# Patient Record
(demographics unavailable — no encounter records)

---

## 2025-01-10 NOTE — PHYSICAL EXAM
[General Appearance - Well Developed] : interactive [General Appearance - Well-Appearing] : well appearing [General Appearance - In No Acute Distress] : in no acute distress [Appearance Of Head] : the head was normocephalic [Sclera] : the sclera and conjunctiva were normal [PERRL With Normal Accommodation] : pupils were equal in size, round, reactive to light, with normal accommodation [Extraocular Movements] : extraocular movements were intact [Outer Ear] : the ears and nose were normal in appearance [Both Tympanic Membranes Were Examined] : both tympanic membranes were normal [Nasal Cavity] : the nasal mucosa and septum were normal [Examination Of The Oral Cavity] : the teeth, gums, and palate were normal [Oropharynx] : the oropharynx was normal  [Neck Cervical Mass (___cm)] : no neck mass was observed [Respiration, Rhythm And Depth] : normal respiratory rhythm and effort [Auscultation Breath Sounds / Voice Sounds] : clear bilateral breath sounds [Heart Rate And Rhythm] : heart rate and rhythm were normal [Heart Sounds] : normal S1 and S2 [Murmurs] : no murmurs [Bowel Sounds] : normal bowel sounds [Abdomen Soft] : soft [Abdominal Distention] : nondistended [Epigastric] : in the epigastric area [Musculoskeletal Exam: Normal Movement Of All Extremities] : normal movements of all extremities [Motor Tone] : muscle strength and tone were normal [No Visual Abnormalities] : no visible abnormailities [Deep Tendon Reflexes (DTR)] : deep tendon reflexes were 2+ and symmetric [Generalized Lymph Node Enlargement] : no lymphadenopathy [Skin Color & Pigmentation] : normal skin color and pigmentation [Skin Lesions] : no skin lesions [] : no significant rash [Initial Inspection: Infant Active And Alert] : active and alert

## 2025-01-10 NOTE — DISCUSSION/SUMMARY
[FreeTextEntry1] : 15 yo patient nausea and epigastric pain R/O GE reflux-Trial of Famotidine for 2 weeks Recommend: -Avoid greasy, fried, spicy foods. -Avoid eating late at night. -Avoid skipping meals. -To maintain hydration, consume "oral rehydration solution," such as Pedialyte/Pedialyte pops, water if eating. May give daily probiotic until s/s resolve. -Avoid drinking juice or soda or milk -- These can make diarrhea worse. If tolerating solids- bland diet/BRAT -- fruits, vegetables, and whole-grain breads and cereals. Avoid eating foods with a lot of fat or sugar or foods that are fried/greasy, which can make symptoms worse.  RED FLAGS REVIEWED - indications for ED eval discussed, signs of distress/dehydration reviewed -  Parent and patient agrees with plan, demonstrates an understanding, is able to repeat back instructions and has no questions at this time.  Urine and Urine culture to lab to R/O UTI. Is scheduled for CPE on 01/15/25. Return sooner PRN.

## 2025-01-10 NOTE — HISTORY OF PRESENT ILLNESS
[Acute] : for an acute visit [Abdominal Pain] : abdominal pain [Diarrhea] : diarrhea [Vomiting] : vomiting [Mother] : mother [Headache] : Headache [FreeTextEntry2] : Generalized abdominal pain with mild nausea in the morning, loose stools and spitting up intermittently. Symptoms started 2 weeks ago with menses. [FreeTextEntry1] : nausea in the morning [FreeTextEntry6] : 2 weeks ago with inset of menses

## 2025-01-10 NOTE — REVIEW OF SYSTEMS
[Vomiting] : vomiting [Diarrhea] : diarrhea [Abdominal Pain] : abdominal pain [Headache] : headache [Urinary Frequency] : urinary frequency [Pain During Urination (Dysuria)] : dysuria [Change in Activity] : no change in activity [Fever] : no fever [Wgt Loss (___ Lbs)] : no recent weight loss [Eye Discharge] : no eye discharge [Redness] : no redness [Swollen Eyelids] : no swollen eyelids [Change in Vision] : no change in vision  [Nasal Stuffiness] : no nasal congestion [Sore Throat] : no sore throat [Earache] : no earache [Nosebleeds] : no epistaxis [Cyanosis] : no cyanosis [Edema] : no edema [Diaphoresis] : not diaphoretic [Exercise Intolerance] : no persistence of exercise intolerance [Chest Pain] : no chest pain or discomfort [Palpitations] : no palpitations [Tachypnea] : not tachypneic [Wheezing] : no wheezing [Cough] : no cough [Shortness of Breath] : no shortness of breath [Change in Appetite] : no change in appetite [Constipation] : no constipation [Fainting (Syncope)] : no fainting [Seizure] : no seizures [Dizziness] : no dizziness [Limping] : no limping [Joint Pains] : no arthralgias [Joint Swelling] : no joint swelling [Back Pain] : ~T no back pain [Muscle Aches] : no muscle aches [Rash] : no rash [Insect Bites] : no insect bites [Skin Lesions] : no skin lesions [Bruising] : no tendency for easy bruising [Swollen Glands] : no lymphadenopathy [Sleep Disturbances] : ~T no sleep disturbances [Hyperactive] : no hyperactive behavior [Emotional Problems] : no ~T emotional problems [Change In Personality] : ~T no personality change [Dec Urine Output] : no oliguria [Vaginal Discharge] : no vaginal discharge [Pubertal Concerns] : no pubertal concerns [Delayed Menarche] : no delayed menarche [Irregular Periods] : no irregular periods

## 2025-01-16 NOTE — PHYSICAL EXAM
[Alert] : alert [No Acute Distress] : no acute distress [Normocephalic] : normocephalic [EOMI Bilateral] : EOMI bilateral [Clear tympanic membranes with bony landmarks and light reflex present bilaterally] : clear tympanic membranes with bony landmarks and light reflex present bilaterally  [Pink Nasal Mucosa] : pink nasal mucosa [Nonerythematous Oropharynx] : nonerythematous oropharynx [Supple, full passive range of motion] : supple, full passive range of motion [No Palpable Masses] : no palpable masses [Clear to Auscultation Bilaterally] : clear to auscultation bilaterally [Regular Rate and Rhythm] : regular rate and rhythm [Normal S1, S2 audible] : normal S1, S2 audible [No Murmurs] : no murmurs [+2 Femoral Pulses] : +2 femoral pulses [Soft] : soft [Non Distended] : non distended [NonTender] : non tender [Normoactive Bowel Sounds] : normoactive bowel sounds [No Hepatomegaly] : no hepatomegaly [No Splenomegaly] : no splenomegaly [No Abnormal Lymph Nodes Palpated] : no abnormal lymph nodes palpated [Normal Muscle Tone] : normal muscle tone [No Gait Asymmetry] : no gait asymmetry [No pain or deformities with palpation of bone, muscles, joints] : no pain or deformities with palpation of bone, muscles, joints [Straight] : straight [+2 Patella DTR] : +2 patella DTR [Cranial Nerves Grossly Intact] : cranial nerves grossly intact [No Rash or Lesions] : no rash or lesions [Gian: _____] : Gian [unfilled]

## 2025-01-16 NOTE — PHYSICAL EXAM

## 2025-01-22 NOTE — DISCUSSION/SUMMARY
[Normal Growth] : growth [Normal Development] : development  [No Elimination Concerns] : elimination [Continue Regimen] : feeding [No Skin Concerns] : skin [Normal Sleep Pattern] : sleep [None] : no medical problems [Anticipatory Guidance Given] : Anticipatory guidance addressed as per the history of present illness section [No Vaccines] : no vaccines needed [No Medications] : ~He/She~ is not on any medications [Patient] : patient [Parent/Guardian] : Parent/Guardian [FreeTextEntry1] : Flaca is a 15 yr old female presenting for WCC She has not had a WCC since 2021.Moc states she does not like to go to Doctors. , drinks [employer], sometimes soda She is stooling daily. She has not seen dentist but is brushing teeth. She is in 10th grade and doing well, not participating in any clubs or sports She does not eat regular fruit and veg, does not drink enough water daily maybe 3 cups She states that she was diagnosed with a murmur in the past and was referred to Cardiology but has never followed up. MOC states that there is a strong family hx of heart disease with 3 of mother's siblings requiring open heart surgery and one of her brothers had sudden cardiac death at age 64. Patient denies any palpitations, chest pain or SOB with exercise.  Has c/o ankle pain dating back to 2019, when she jumped and ultimate had a Sprain with swelling, went to [facility], she will get ankle pain with heavy physical exercise since, she had Xray's in past which were reportedly normal. She also reports getting knee pain occasionally after participating in Gym adn with heavy physical activity, reports that she had swelling after doing heavy walking especially last summer while in [place] in August.  MOC report that she believes that she has an allergy to Amoxicilian as she developed a rash after taking it. Unclear history  Flaca and her mother report that she has had fevers last week.  She reports having fever only 1x per day for Fevers have stopped. She also reports having fever 12/27 for several days but not daily. She did not take her temperature but feels that he had a fever because she said she felt hot. She also had 5 days of diarrhea with stools occurring 3 times per day which tapered to 1x per day, She also had belly ache and felt nauseous. She reports having Headaches  (location back of head) which occur in afternoon and can last 1 hour, Tylenol helps.  She states she is eating and drinking  Her vaccine records how that she is missing a Tdap, but mother reports that Flaca has had it at the urgent care and has given the records to the school, otherwise she is UTD and Mother declines flu and HPV vaccine today. Information given to her regarding vaccines including [facility] vaccine resource. Mother has family that believe HPV caused adverse effects.  BMI- 90% PHQ-2 score 0 Inadvertently did not conduct private Headsss exam as there there were many obscure and new complaints  History of Fevers not very good historians and often in setting of viral symptoms. Advised to get a thermometer and record when fever is suspected and maintain a log. Will follow up   Dysmenorrhea- has painful cramps throughout the course of her period, takes Aleve, Tylenol, Midol, with relief. There are no irregularities in her cycle nor heavy flow.  Consider GYN referral.  Chronic ankle/Knee pain Referral to Ortho  Hx of Murmur (unable to appreciate on exam) and Strong family hx of heart disease Referral to Cardiology  Referral to A&I for Amox challenge, rule out allergy  Patient and mother with many complaints and confusing history a and poor historians. Complaints often date back many years with inconsistencies. Discussed importance of regular well visits to for preventive health care.   Discussed having a follow up visit next week. Will run screening labs Referrals made.

## 2025-01-22 NOTE — REVIEW OF SYSTEMS
[Dysmenorrhea] : dysmenorrhea [Negative] : Genitourinary [Fever] : no fever [Vomiting] : no vomiting [Diarrhea] : no diarrhea

## 2025-01-22 NOTE — HISTORY OF PRESENT ILLNESS
[Home] : at home, [unfilled] , at the time of the visit. [Medical Office: ([facility])___] : at the medical office located in  [Mother] : mother [Verbal consent obtained from patient] : the patient, [unfilled] [de-identified] : F/U [FreeTextEntry6] :  Had previous discussed having fevers but did not have a thermometer She has since purchased a thermometer and has not noted any fevers since last we spoke  C/O tummy and nausea comes and goes Patient endorsed had had abdominal pain with diarrhea that had Stopped on Sunday No vomiting, no diarrhea  She has now started having cramping which started yesterday, however she does endorse that it is her usual premenstrual cramps and she is due to get her period in 4 days Due for menses in 4 days She states that cramping is not bad and she doesn't need to take any pain reliever

## 2025-01-22 NOTE — HISTORY OF PRESENT ILLNESS
[Yes] : Patient goes to dentist yearly [Toothpaste] : Primary Fluoride Source: Toothpaste [Up to date] : Up to date [LMP: _____] : LMP: [unfilled] [Cycle Length: _____ days] : Cycle Length: [unfilled] days [Days of Bleeding: _____] : Days of bleeding: [unfilled] [Age of Menarche: ____] : Age of Menarche: [unfilled] [Painful Cramps] : painful cramps [Sleep Concerns] : sleep concerns [Grade: ____] : Grade: [unfilled] [Normal Performance] : normal performance [Normal Behavior/Attention] : normal behavior/attention [Normal Homework] : normal homework [No] : No cigarette smoke exposure [Uses safety belts/safety equipment] : uses safety belts/safety equipment  [Has peer relationships free of violence] : has peer relationships free of violence [Has ways to cope with stress] : has ways to cope with stress [With Teen] : teen [NO] : No [Irregular menses] : no irregular menses [Heavy Bleeding] : no heavy bleeding [Eats meals with family] : does not eat meals with family [Eats regular meals including adequate fruits and vegetables] : does not eat regular meals including adequate fruits and vegetables [de-identified] : needs dental  [FreeTextEntry8] : painful, takes tylenol or aleve midol , aleve  helps  [de-identified] : no sports, no clubs,  [de-identified] : drinking water,  3 cups  daily sometimes, soda , sometimes, starbucck s [FreeTextEntry1] : BHxC/S- GDM, version abruptio placenta, meconium, Nicu for 11 days ,  [facility] PMHx No WCC since? No Records, Last [facility] 2021, murmur, referred to Cardiology, Illness- none  Hospitalizations- none Surgeries- none  Allergies- allergy cinnamon, amox allegy Hives, did not get amox challenge   Meds- none  Vaccines UTD, per mother. Flu-declines  declines HPV  Family Hx-  mom type 2 diabetic medication controlled, Dad- Graves dx, hyper cholesterolemia, Maternal Uncles all with heart disease, sudden death at age 64, others had open heart surgery, blocked arteries , Paternal aunt uncle and G-mother liver conditions, chirosis,  alcholohism, Grandmother- no hx of alcholol, maternal aunt,uncle and Grandfather diabetics Type 2  Social Hx: Current living situation- lives with Mother , father and older siblings sisters 26, 25, 24 ( two are living at home) Migration hx,  Was seen in this office last week for concerns of epigastric pain, nausea was trailed famotidine for 2 weeks, and Zofran PRN Also had some concerns related to dysuria, urine sample was not returned  No weight loss  c/o of ankle pain when jumping swelled in 2019 twisted , sprained ankle  [facility] to [facility] Only occurs once may onece with heavy physcal activity had xrays Knees hurt in gym hurt in gym pain occational in gym knees swolling from walking during summer August,  [place] in heat

## 2025-01-22 NOTE — DISCUSSION/SUMMARY
[Normal Growth] : growth [Normal Development] : development  [No Elimination Concerns] : elimination [Continue Regimen] : feeding [No Skin Concerns] : skin [Normal Sleep Pattern] : sleep [None] : no medical problems [Anticipatory Guidance Given] : Anticipatory guidance addressed as per the history of present illness section [No Vaccines] : no vaccines needed [No Medications] : ~He/She~ is not on any medications [Patient] : patient [Parent/Guardian] : Parent/Guardian [FreeTextEntry1] : Flaca is a 15 yr old female presenting for WCC She has not had a WCC since 2021.Moc states she does not like to go to Doctors. , drinks [employer], sometimes soda She is stooling daily. She has not seen dentist but is brushing teeth. She is in 10th grade and doing well, not participating in any clubs or sports She does not eat regular fruit and veg, does not drink enough water daily maybe 3 cups She states that she was diagnosed with a murmur in the past and was referred to Cardiology but has never followed up. MOC states that there is a strong family hx of heart disease with 3 of mother's siblings requiring open heart surgery and one of her brothers had sudden cardiac death at age 64. Patient denies any palpitations, chest pain or SOB with exercise.  Has c/o ankle pain dating back to 2019, when she jumped and ultimate had a Sprain with swelling, went to [facility], she will get ankle pain with heavy physical exercise since, she had Xray's in past which were reportedly normal. She also reports getting knee pain occasionally after participating in Gym adn with heavy physical activity, reports that she had swelling after doing heavy walking especially last summer while in [place] in August.  MOC report that she believes that she has an allergy to Amoxicilian as she developed a rash after taking it. Unclear history  Flaca and her mother report that she has had fevers last week.  She reports having fever only 1x per day for Fevers have stopped. She also reports having fever 12/27 for several days but not daily. She did not take her temperature but feels that he had a fever because she said she felt hot. She also had 5 days of diarrhea with stools occurring 3 times per day which tapered to 1x per day, She also had belly ache and felt nauseous. She reports having Headaches  (location back of head) which occur in afternoon and can last 1 hour, Tylenol helps.  She states she is eating and drinking  Her vaccine records how that she is missing a Tdap, but mother reports that Flaca has had it at the urgent care and has given the records to the school, otherwise she is UTD and Mother declines flu and HPV vaccine today. Information given to her regarding vaccines including [facility] vaccine resource. Mother has family that believe HPV caused adverse effects.  BMI- 90% PHQ-2 score 0 Inadvertently did not conduct private Headsss exam as there there were many obscure and new complaints  History of Fevers not very good historians and often in setting of viral symptoms. Advised to get a thermometer and record when fever is suspected and maintain a log. Will follow up   Dysmenorrhea- has painful cramps throughout the course of her period, takes Aleve, Tylenol, Midol, with relief. There are no irregularities in her cycle nor heavy flow.  Consider GYN referral.  Chronic ankle/Knee pain Referral to Ortho  Hx of Murmur (unable to appreciate on exam) and Strong family hx of heart disease Referral to Cardiology  Referral to A&I for Amox challenge, rule out allergy  Patient and mother with many complaints and confusing history a and poor historians. Complaints often date back many years with inconsistencies. Discussed importance of regular well visits to for preventive health care.   Discussed having a follow up visit next week. Will run screening labs Referrals made.

## 2025-01-22 NOTE — DISCUSSION/SUMMARY
[Normal Growth] : growth [Normal Development] : development  [No Elimination Concerns] : elimination [Continue Regimen] : feeding [No Skin Concerns] : skin [Normal Sleep Pattern] : sleep [None] : no medical problems [Anticipatory Guidance Given] : Anticipatory guidance addressed as per the history of present illness section [No Vaccines] : no vaccines needed [No Medications] : ~He/She~ is not on any medications [Patient] : patient [Parent/Guardian] : Parent/Guardian [FreeTextEntry1] : Flaca is a 15 yr old female presenting for WCC She has not had a WCC since 2021.Moc states she does not like to go to Doctors. , drinks [employer], sometimes soda She is stooling daily. She has not seen dentist but is brushing teeth. She is in 10th grade and doing well, not participating in any clubs or sports She does not eat regular fruit and veg, does not drink enough water daily maybe 3 cups She states that she was diagnosed with a murmur in the past and was referred to Cardiology but has never followed up. MOC states that there is a strong family hx of heart disease with 3 of mother's siblings requiring open heart surgery and one of her brothers had sudden cardiac death at age 64. Patient denies any palpitations, chest pain or SOB with exercise.  Has c/o ankle pain dating back to 2019, when she jumped and ultimate had a Sprain with swelling, went to [facility], she will get ankle pain with heavy physical exercise since, she had Xray's in past which were reportedly normal. She also reports getting knee pain occasionally after participating in Gym adn with heavy physical activity, reports that she had swelling after doing heavy walking especially last summer while in [place] in August.  MOC report that she believes that she has an allergy to Amoxicilian as she developed a rash after taking it. Unclear history  Flaca and her mother report that she has had fevers last week.  She reports having fever only 1x per day for Fevers have stopped. She also reports having fever 12/27 for several days but not daily. She did not take her temperature but feels that he had a fever because she said she felt hot. She also had 5 days of diarrhea with stools occurring 3 times per day which tapered to 1x per day, She also had belly ache and felt nauseous. She reports having Headaches  (location back of head) which occur in afternoon and can last 1 hour, Tylenol helps.  She states she is eating and drinking  Her vaccine records how that she is missing a Tdap, but mother reports that Flaca has had it at the urgent care and has given the records to the school, otherwise she is UTD and Mother declines flu and HPV vaccine today. Information given to her regarding vaccines including [facility] vaccine resource. Mother has family that believe HPV caused adverse effects.  BMI- 90% PHQ-2 score 0 Inadvertently did not conduct private Headsss exam as there there were many obscure and new complaints  History of Fevers not very good historians and often in setting of viral symptoms. Advised to get a thermometer and record when fever is suspected and maintain a log. Will follow up   Dysmenorrhea- has painful cramps throughout the course of her period, takes Aleve, Tylenol, Midol, with relief. There are no irregularities in her cycle nor heavy flow.  Consider GYN referral.  Chronic ankle/Knee pain Referral to Ortho  Hx of Murmur (unable to appreciate on exam) and Strong family hx of heart disease Referral to Cardiology  Referral to A&I for Amox challenge, rule out allergy  Patient and mother with many complaints and confusing history a and poor historians. Complaints often date back many years with inconsistencies. Discussed importance of regular well visits to for preventive health care.   Discussed having a follow up visit next week. Will run screening labs Referrals made.

## 2025-01-22 NOTE — RISK ASSESSMENT
[No Increased risk of SCA or SCD] : No Increased risk of SCA or SCD    [0] : 2) Feeling down, depressed, or hopeless: Not at all (0) [PHQ-2 Negative - No further assessment needed] : PHQ-2 Negative - No further assessment needed [[id number]] : 0 [Have you ever fainted, passed out or had an unexplained seizure suddenly and without warning, especially during exercise or in response] : Have you ever fainted, passed out or had an unexplained seizure suddenly and without warning, especially during exercise or in response to sudden loud noises such as doorbells, alarm clocks and ringing telephones? No [Have you ever had exercise-related chest pain or shortness of breath?] : Have you ever had exercise-related chest pain or shortness of breath? No [Has anyone in your immediate family (parents, grandparents, siblings) or other more distant relatives (aunts, uncles, cousins)  of heart] : Has anyone in your immediate family (parents, grandparents, siblings) or other more distant relatives (aunts, uncles, cousins)  of heart problems or had an unexpected sudden death before age 50 (This would include unexpected drownings, unexplained car accidents in which the relative was driving or sudden infant death syndrome.)? No [Are you related to anyone with hypertrophic cardiomyopathy or hypertrophic obstructive cardiomyopathy, Marfan syndrome, arrhythmogenic] : Are you related to anyone with hypertrophic cardiomyopathy or hypertrophic obstructive cardiomyopathy, Marfan syndrome, arrhythmogenic right ventricular cardiomyopathy, long QT syndrome, short QT syndrome, Brugada syndrome or catecholaminergic polymorphic ventricular tachycardia, or anyone younger than 50 years with a pacemaker or implantable defibrillator? No

## 2025-01-22 NOTE — REVIEW OF SYSTEMS
[Abdominal Pain] : abdominal pain [Negative] : Genitourinary [Fever] : fever [Knee Pain] : knee pain [Ankle Pain] : ankle pain [Ankle Swelling] : ankle swelling

## 2025-01-22 NOTE — HISTORY OF PRESENT ILLNESS
[Yes] : Patient goes to dentist yearly [Toothpaste] : Primary Fluoride Source: Toothpaste [Up to date] : Up to date [LMP: _____] : LMP: [unfilled] [Cycle Length: _____ days] : Cycle Length: [unfilled] days [Days of Bleeding: _____] : Days of bleeding: [unfilled] [Age of Menarche: ____] : Age of Menarche: [unfilled] [Painful Cramps] : painful cramps [Sleep Concerns] : sleep concerns [Grade: ____] : Grade: [unfilled] [Normal Performance] : normal performance [Normal Behavior/Attention] : normal behavior/attention [Normal Homework] : normal homework [No] : No cigarette smoke exposure [Uses safety belts/safety equipment] : uses safety belts/safety equipment  [Has peer relationships free of violence] : has peer relationships free of violence [Has ways to cope with stress] : has ways to cope with stress [With Teen] : teen [NO] : No [Irregular menses] : no irregular menses [Heavy Bleeding] : no heavy bleeding [Eats meals with family] : does not eat meals with family [Eats regular meals including adequate fruits and vegetables] : does not eat regular meals including adequate fruits and vegetables [de-identified] : needs dental  [FreeTextEntry8] : painful, takes tylenol or aleve midol , aleve  helps  [de-identified] : no sports, no clubs,  [de-identified] : drinking water,  3 cups  daily sometimes, soda , sometimes, starbucck s [FreeTextEntry1] : BHxC/S- GDM, version abruptio placenta, meconium, Nicu for 11 days ,  [facility] PMHx No WCC since? No Records, Last [facility] 2021, murmur, referred to Cardiology, Illness- none  Hospitalizations- none Surgeries- none  Allergies- allergy cinnamon, amox allegy Hives, did not get amox challenge   Meds- none  Vaccines UTD, per mother. Flu-declines  declines HPV  Family Hx-  mom type 2 diabetic medication controlled, Dad- Graves dx, hyper cholesterolemia, Maternal Uncles all with heart disease, sudden death at age 64, others had open heart surgery, blocked arteries , Paternal aunt uncle and G-mother liver conditions, chirosis,  alcholohism, Grandmother- no hx of alcholol, maternal aunt,uncle and Grandfather diabetics Type 2  Social Hx: Current living situation- lives with Mother , father and older siblings sisters 26, 25, 24 ( two are living at home) Migration hx,  Was seen in this office last week for concerns of epigastric pain, nausea was trailed famotidine for 2 weeks, and Zofran PRN Also had some concerns related to dysuria, urine sample was not returned  No weight loss  c/o of ankle pain when jumping swelled in 2019 twisted , sprained ankle  [facility] to [facility] Only occurs once may onece with heavy physcal activity had xrays Knees hurt in gym hurt in gym pain occational in gym knees swolling from walking during summer August,  [place] in heat

## 2025-01-22 NOTE — HISTORY OF PRESENT ILLNESS
[Yes] : Patient goes to dentist yearly [Toothpaste] : Primary Fluoride Source: Toothpaste [Up to date] : Up to date [LMP: _____] : LMP: [unfilled] [Cycle Length: _____ days] : Cycle Length: [unfilled] days [Days of Bleeding: _____] : Days of bleeding: [unfilled] [Age of Menarche: ____] : Age of Menarche: [unfilled] [Painful Cramps] : painful cramps [Sleep Concerns] : sleep concerns [Grade: ____] : Grade: [unfilled] [Normal Performance] : normal performance [Normal Behavior/Attention] : normal behavior/attention [Normal Homework] : normal homework [No] : No cigarette smoke exposure [Uses safety belts/safety equipment] : uses safety belts/safety equipment  [Has peer relationships free of violence] : has peer relationships free of violence [Has ways to cope with stress] : has ways to cope with stress [With Teen] : teen [NO] : No [Irregular menses] : no irregular menses [Heavy Bleeding] : no heavy bleeding [Eats meals with family] : does not eat meals with family [Eats regular meals including adequate fruits and vegetables] : does not eat regular meals including adequate fruits and vegetables [de-identified] : needs dental  [FreeTextEntry8] : painful, takes tylenol or aleve midol , aleve  helps  [de-identified] : no sports, no clubs,  [de-identified] : drinking water,  3 cups  daily sometimes, soda , sometimes, starbucck s [FreeTextEntry1] : BHxC/S- GDM, version abruptio placenta, meconium, Nicu for 11 days ,  [facility] PMHx No WCC since? No Records, Last [facility] 2021, murmur, referred to Cardiology, Illness- none  Hospitalizations- none Surgeries- none  Allergies- allergy cinnamon, amox allegy Hives, did not get amox challenge   Meds- none  Vaccines UTD, per mother. Flu-declines  declines HPV  Family Hx-  mom type 2 diabetic medication controlled, Dad- Graves dx, hyper cholesterolemia, Maternal Uncles all with heart disease, sudden death at age 64, others had open heart surgery, blocked arteries , Paternal aunt uncle and G-mother liver conditions, chirosis,  alcholohism, Grandmother- no hx of alcholol, maternal aunt,uncle and Grandfather diabetics Type 2  Social Hx: Current living situation- lives with Mother , father and older siblings sisters 26, 25, 24 ( two are living at home) Migration hx,  Was seen in this office last week for concerns of epigastric pain, nausea was trailed famotidine for 2 weeks, and Zofran PRN Also had some concerns related to dysuria, urine sample was not returned  No weight loss  c/o of ankle pain when jumping swelled in 2019 twisted , sprained ankle  [facility]ent to [facility] Only occurs once may onece with heavy physcal activity had xrays Knees hurt in gym hurt in gym pain occational in gym knees swolling from walking during summer August,  [place] in heat

## 2025-01-22 NOTE — RISK ASSESSMENT
[No Increased risk of SCA or SCD] : No Increased risk of SCA or SCD    [0] : 2) Feeling down, depressed, or hopeless: Not at all (0) [PHQ-2 Negative - No further assessment needed] : PHQ-2 Negative - No further assessment needed [[id number]] : 0 [Have you ever fainted, passed out or had an unexplained seizure suddenly and without warning, especially during exercise or in response] : Have you ever fainted, passed out or had an unexplained seizure suddenly and without warning, especially during exercise or in response to sudden loud noises such as doorbells, alarm clocks and ringing telephones? No [Have you ever had exercise-related chest pain or shortness of breath?] : Have you ever had exercise-related chest pain or shortness of breath? No [Has anyone in your immediate family (parents, grandparents, siblings) or other more distant relatives (aunts, uncles, cousins)  of heart] : Has anyone in your immediate family (parents, grandparents, siblings) or other more distant relatives (aunts, uncles, cousins)  of heart problems or had an unexpected sudden death before age 50 (This would include unexpected drownings, unexplained car accidents in which the relative was driving or sudden infant death syndrome.)? No [Are you related to anyone with hypertrophic cardiomyopathy or hypertrophic obstructive cardiomyopathy, Marfan syndrome, arrhythmogenic] : Are you related to anyone with hypertrophic cardiomyopathy or hypertrophic obstructive cardiomyopathy, Marfan syndrome, arrhythmogenic right ventricular cardiomyopathy, long QT syndrome, short QT syndrome, Brugada syndrome or catecholaminergic polymorphic ventricular tachycardia, or anyone younger than 50 years with a pacemaker or implantable defibrillator? No

## 2025-01-22 NOTE — HISTORY OF PRESENT ILLNESS
[Yes] : Patient goes to dentist yearly [Toothpaste] : Primary Fluoride Source: Toothpaste [Up to date] : Up to date [LMP: _____] : LMP: [unfilled] [Cycle Length: _____ days] : Cycle Length: [unfilled] days [Days of Bleeding: _____] : Days of bleeding: [unfilled] [Age of Menarche: ____] : Age of Menarche: [unfilled] [Painful Cramps] : painful cramps [Sleep Concerns] : sleep concerns [Grade: ____] : Grade: [unfilled] [Normal Performance] : normal performance [Normal Behavior/Attention] : normal behavior/attention [Normal Homework] : normal homework [No] : No cigarette smoke exposure [Uses safety belts/safety equipment] : uses safety belts/safety equipment  [Has peer relationships free of violence] : has peer relationships free of violence [Has ways to cope with stress] : has ways to cope with stress [With Teen] : teen [NO] : No [Irregular menses] : no irregular menses [Heavy Bleeding] : no heavy bleeding [Eats meals with family] : does not eat meals with family [Eats regular meals including adequate fruits and vegetables] : does not eat regular meals including adequate fruits and vegetables [de-identified] : needs dental  [FreeTextEntry8] : painful, takes tylenol or aleve midol , aleve  helps  [de-identified] : no sports, no clubs,  [de-identified] : drinking water,  3 cups  daily sometimes, soda , sometimes, starbucck s [FreeTextEntry1] : BHxC/S- GDM, version abruptio placenta, meconium, Nicu for 11 days ,  [facility] PMHx No WCC since? No Records, Last [facility] 2021, murmur, referred to Cardiology, Illness- none  Hospitalizations- none Surgeries- none  Allergies- allergy cinnamon, amox allegy Hives, did not get amox challenge   Meds- none  Vaccines UTD, per mother. Flu-declines  declines HPV  Family Hx-  mom type 2 diabetic medication controlled, Dad- Graves dx, hyper cholesterolemia, Maternal Uncles all with heart disease, sudden death at age 64, others had open heart surgery, blocked arteries , Paternal aunt uncle and G-mother liver conditions, chirosis,  alcholohism, Grandmother- no hx of alcholol, maternal aunt,uncle and Grandfather diabetics Type 2  Social Hx: Current living situation- lives with Mother , father and older siblings sisters 26, 25, 24 ( two are living at home) Migration hx,  Was seen in this office last week for concerns of epigastric pain, nausea was trailed famotidine for 2 weeks, and Zofran PRN Also had some concerns related to dysuria, urine sample was not returned  No weight loss  c/o of ankle pain when jumping swelled in 2019 twisted , sprained ankle  [facility]ent to [facility] Only occurs once may onece with heavy physcal activity had xrays Knees hurt in gym hurt in gym pain occational in gym knees swolling from walking during summer August,  [place] in heat

## 2025-01-22 NOTE — RISK ASSESSMENT
[No Increased risk of SCA or SCD] : No Increased risk of SCA or SCD    [0] : 2) Feeling down, depressed, or hopeless: Not at all (0) [PHQ-2 Negative - No further assessment needed] : PHQ-2 Negative - No further assessment needed [[id number]] : 0 [Have you ever fainted, passed out or had an unexplained seizure suddenly and without warning, especially during exercise or in response] : Have you ever fainted, passed out or had an unexplained seizure suddenly and without warning, especially during exercise or in response to sudden loud noises such as doorbells, alarm clocks and ringing telephones? No [Have you ever had exercise-related chest pain or shortness of breath?] : Have you ever had exercise-related chest pain or shortness of breath? No [Has anyone in your immediate family (parents, grandparents, siblings) or other more distant relatives (aunts, uncles, cousins)  of heart] : Has anyone in your immediate family (parents, grandparents, siblings) or other more distant relatives (aunts, uncles, cousins)  of heart problems or had an unexpected sudden death before age 50 (This would include unexpected drownings, unexplained car accidents in which the relative was driving or sudden infant death syndrome.)? No [Are you related to anyone with hypertrophic cardiomyopathy or hypertrophic obstructive cardiomyopathy, Marfan syndrome, arrhythmogenic] : Are you related to anyone with hypertrophic cardiomyopathy or hypertrophic obstructive cardiomyopathy, Marfan syndrome, arrhythmogenic right ventricular cardiomyopathy, long QT syndrome, short QT syndrome, Brugada syndrome or catecholaminergic polymorphic ventricular tachycardia, or anyone younger than 50 years with a pacemaker or implantable defibrillator? No

## 2025-01-22 NOTE — DISCUSSION/SUMMARY
[Normal Growth] : growth [Normal Development] : development  [No Elimination Concerns] : elimination [Continue Regimen] : feeding [No Skin Concerns] : skin [Normal Sleep Pattern] : sleep [None] : no medical problems [Anticipatory Guidance Given] : Anticipatory guidance addressed as per the history of present illness section [No Vaccines] : no vaccines needed [No Medications] : ~He/She~ is not on any medications [Patient] : patient [Parent/Guardian] : Parent/Guardian [FreeTextEntry1] : Flaca is a 15 yr old female presenting for WCC She has not had a WCC since 2021.Moc states she does not like to go to Doctors. , drinks [employer], sometimes soda She is stooling daily. She has not seen dentist but is brushing teeth. She is in 10th grade and doing well, not participating in any clubs or sports She does not eat regular fruit and veg, does not drink enough water daily maybe 3 cups She states that she was diagnosed with a murmur in the past and was referred to Cardiology but has never followed up. MOC states that there is a strong family hx of heart disease with 3 of mother's siblings requiring open heart surgery and one of her brothers had sudden cardiac death at age 64. Patient denies any palpitations, chest pain or SOB with exercise.  Has c/o ankle pain dating back to 2019, when she jumped and ultimate had a Sprain with swelling, [facility], she will get ankle pain with heavy physical exercise since, she had Xray's in past which were reportedly normal. She also reports getting knee pain occasionally after participating in Gym adn with heavy physical activity, reports that she had swelling after doing heavy walking especially last summer while in [place] in August.  MOC report that she believes that she has an allergy to Amoxicilian as she developed a rash after taking it. Unclear history  Flaca and her mother report that she has had fevers last week.  She reports having fever only 1x per day for Fevers have stopped. She also reports having fever 12/27 for several days but not daily. She did not take her temperature but feels that he had a fever because she said she felt hot. She also had 5 days of diarrhea with stools occurring 3 times per day which tapered to 1x per day, She also had belly ache and felt nauseous. She reports having Headaches  (location back of head) which occur in afternoon and can last 1 hour, Tylenol helps.  She states she is eating and drinking  Her vaccine records how that she is missing a Tdap, but mother reports that Flaca has had it at the urgent care and has given the records to the school, otherwise she is UTD and Mother declines flu and HPV vaccine today. Information given to her regarding vaccines including [facility] vaccine resource. Mother has family that believe HPV caused adverse effects.  BMI- 90% PHQ-2 score 0 Inadvertently did not conduct private Headsss exam as there there were many obscure and new complaints  History of Fevers not very good historians and often in setting of viral symptoms. Advised to get a thermometer and record when fever is suspected and maintain a log. Will follow up   Dysmenorrhea- has painful cramps throughout the course of her period, takes Aleve, Tylenol, Midol, with relief. There are no irregularities in her cycle nor heavy flow.  Consider GYN referral.  Chronic ankle/Knee pain Referral to Ortho  Hx of Murmur (unable to appreciate on exam) and Strong family hx of heart disease Referral to Cardiology  Referral to A&I for Amox challenge, rule out allergy  Patient and mother with many complaints and confusing history a and poor historians. Complaints often date back many years with inconsistencies. Discussed importance of regular well visits to for preventive health care.   Discussed having a follow up visit next week. Will run screening labs Referrals made.

## 2025-01-22 NOTE — RISK ASSESSMENT
[No Increased risk of SCA or SCD] : No Increased risk of SCA or SCD    [0] : 2) Feeling down, depressed, or hopeless: Not at all (0) [PHQ-2 Negative - No further assessment needed] : PHQ-2 Negative - No further assessment needed [[id number]] : 0 [Have you ever fainted, passed out or had an unexplained seizure suddenly and without warning, especially during exercise or in response] : Have you ever fainted, passed out or had an unexplained seizure suddenly and without warning, especially during exercise or in response to sudden loud noises such as doorbells, alarm clocks and ringing telephones? No [Have you ever had exercise-related chest pain or shortness of breath?] : Have you ever had exercise-related chest pain or shortness of breath? No [Has anyone in your immediate family (parents, grandparents, siblings) or other more distant relatives (aunts, uncles, cousins)  of heart] : Has anyone in your immediate family (parents, grandparents, siblings) or other more distant relatives (aunts, uncles, cousins)  of heart problems or had an unexpected sudden death before age 50 (This would include unexpected drownings, unexplained car accidents in which the relative was driving or sudden infant death syndrome.)? No [Are you related to anyone with hypertrophic cardiomyopathy or hypertrophic obstructive cardiomyopathy, Marfan syndrome, arrhythmogenic] : Are you related to anyone with hypertrophic cardiomyopathy or hypertrophic obstructive cardiomyopathy, Marfan syndrome, arrhythmogenic right ventricular cardiomyopathy, long QT syndrome, short QT syndrome, Brugada syndrome or catecholaminergic polymorphic ventricular tachycardia, or anyone younger than 50 years with a pacemaker or implantable defibrillator? No

## 2025-01-22 NOTE — DISCUSSION/SUMMARY
[FreeTextEntry1] : TTM visit  Had previous discussed having fevers but did not have a thermometer She has since purchased a thermometer and has not noted any fevers since last we spoke  C/O tummy and nausea comes and goes Patient endorsed had had abdominal pain with diarrhea that had Stopped on Sunday No vomiting, no diarrhea  She has now started having cramping which started yesterday, however she does endorse that it is her usual premenstrual cramps and she is due to get her period in 4 days Due for menses in 4 days She states that cramping is not bad, and she doesn't need to take any pain reliever She often experiences dysmenorrhea to the point where she will often call to be picked up from school. Referral given for Peds GYN  Reviewed all labs- all labs reassuring Discussed HDL and LDLs Discussed eating a healthy diet Discussed My plate and offered nutritional referral Discussed following up with all previous referrals given

## 2025-02-05 NOTE — PHYSICAL EXAM
[NL] : warm, clear [de-identified] :  CN II-XII intact, strength 5/5 throughout bilateral UE and LE, sensation to light touch intact throughout, DTR 2+ quads/triceps, no dysmetria, normal Romberg, normal gait, normal tone

## 2025-02-05 NOTE — HISTORY OF PRESENT ILLNESS
[FreeTextEntry6] : Here today for acute on chronic vomiting  New patient to practice this past month Had acute visit early Jan for GI issues, trial famotidine for 2 weeks, zofran PRN Had dysuria then, didn't return urine sample Had well visit with us, no prior well care since 2021 Had 1/22/25 telehealth visit for GI issues  Today here for nausea and vomiting in the morning History of daily nausea with periods Occurs when she wakes up Vomits 3x NBNB, usually yellow/clear, no coffee ground Nausea lasts for a few hours every morning  No symptoms afternoon/evening, except yesterday had nausea all day Started 1 month ago, on and off Daily vomiting for last 2 weeks (ONLY during weekdays) Symptoms prior were only during period, but period recently was 1/28-2/3 Period every month, never skips, never twice in a month, no bleeding longer than 8 days Decreased eating overall, feels nausea with all eating, next to no feed yesterday  No issues with drinking, normal UOP No fevers, cough, headaches, dysuria, rashes, new/recent joint pain in last 2 weeks Has had runny nose for 2 weeks, had sore throat for 1 day on 1/28 Had diarrhea for 3 weeks in Jan, daily For last 2 weeks, decreased stool frequency, last yesterday, never hard  No prior history of hard stools  No blood in stools  If she sleeps in, or sleeps in during weekend, no issues Today down 3 kg in 1 month  Past Saturday woke up at 12pm, no vomiting, no nausea  Labs Jan 2025 all normal   During private HEADS assessment: Smokes weed and vapes  Was smoking 1-2x/week Vaping everyday Recently stopped these past 2 weeks  No weed or vaping in last 2 weeks  MOM DOES NOT KNOW  Denies sexually activity Feels down last few weeks due to grades, going down to secondary to absences  No prior life stressors, sadness/anxiety, deaths in the family Denies attempting to miss school, likes school   Took famotidine for 2 weeks and stopped, didn't help Only took zofran once 2 days ago, helped a little  Endorses headaches, daily during period but can occur when off period Occasional night time awakenings, roughly 4-5x this past month No first wake up headaches Occipital, throbbing, no photophobia/phonophobia  Symptoms all began with periods roughly 2 years ago when she had COVID First period was age 11 years

## 2025-02-05 NOTE — DISCUSSION/SUMMARY
[FreeTextEntry1] : 15 year old here for acute on chronic, early morning nausea and vomiting with recent worsening and 3 kg weight loss. Normal exam today. Broad differential below:  - Possible primary GI issue given early Jan 2025 diarrhea and recent evening nausea after PO. Recommended omeprazole trial and urgent GI referral, given today - Possible secondary to period given history of dysmenorrhea/nausea and headaches during period. Recent symptoms were prior to and after period which is different. If symptoms improve over next 2 weeks, may suggest hormonal cause. Patient denies, but early morning vomiting does put pregnancy on the differential as well in this age.  - Possible secondary to marijuana / vaping use, though only was vaping daily. Patient agrees to be abstinent, if symptoms improve may suggest this as cause - Possible related to migraines given occasional night time awakenings. This combined with early AM vomiting are both neurologic red flags.  Step wise plan in shared decision making with family - Will obtain additional labs today - Start omeprazole for 1 month trial  - Referred to GI and Neurology - Continue daily diary of symptoms  - Focus on high calorie foods and hydration - Provided note for school to allow for late morning start / half days / extended homework deadlines and testing time, strongly encouraged to tolerate as much school as possible  - Please go to the emergency room for frequent vomiting, green vomit, severe lethargy, confusion, weakness, blood in vomit, unable to tolerate liquid and pees less than 3 times daily  - Follow up in 2 weeks for weight check and repeat exam - can consider birth control at that time pending symptom course - Call office for any worsening or new symptoms - MOC and patient expressed understanding of plan    During today's visit, services included coordinating care, counseling and education patient/family/caregiver, documenting clinical information in the electronic health record, reviewed medical records and data, total time spent on the E/M service before/during/after visit on date of service: 60 minutes

## 2025-02-14 NOTE — BIRTH HISTORY
[Duration: ___ wks] : duration: [unfilled] weeks [] :  [Normal?] : normal delivery [___ lbs.] : [unfilled] lbs [Was child in NICU?] : Child was in NICU

## 2025-02-19 NOTE — DISCUSSION/SUMMARY
[FreeTextEntry1] : Here for follow up of prolonged AM nausea / vomiting that lasted roughly 1/22-2/10. Typically only occurred in past during periods, but this episode started before and lasted after period. Weight loss at last visit resolved, either due to return of appetite vs mis measurement that day. All labs normal.   Differential still unclear. Will need to see if symptoms return for prolonged time during next period.   1. If symptoms do not recur to same extent, may suggest GERD as cause as patient is on PPI (or vaping/weed as cause - NOT disclosed to Mom).   2. If symptoms recur during period only, may suggest hormonal cause 3. If symptoms are severe/prolonged again during period, may need trial of hormone/birth control. Reviewed with Mom the safety of this medicine, does not affect future fertility, and how to trial medicine safely. Reviewed no other medication for menstrual related nausea available.  4. Please follow up with GI and Neurology (for night time awakening from headaches) as scheduled  Call office for any worsening or concerns.   During today's visit, services included coordinating care, counseling and education patient/family/caregiver, documenting clinical information in the electronic health record, reviewed medical records and data, total time spent on the E/M service before/during/after visit on date of service: 40 minutes

## 2025-02-20 NOTE — DATA REVIEWED
[de-identified] : Bilateral Ankle AP/lateral/oblique X-rays: There is no fracture or cortical irregularity noted. The growth plates are open with no widening or irregularities of the growth plate. The mortise joint appears normal with no widening over the medial or lateral aspect of the joint. There is no OCD lesion noted.  There is no callus formation indicating a hidden healing fracture. There are no suspicious cysts or masses noted. No signs of osteopenia.   Right knee AP/LAT/obl [place] x rays: No fracture. No OCD. The joint line appears normal.

## 2025-02-20 NOTE — END OF VISIT
[FreeTextEntry3] : A physician assistant/resident assisted with documenting the visit and acted as a scribe. I have seen and examined the patient, made my assessment and plan and have made all modifications necessary to the note.  Renee Galvez MD Pediatric Orthopaedics Surgery [facility]

## 2025-02-20 NOTE — END OF VISIT
[FreeTextEntry3] : A physician assistant/resident assisted with documenting the visit and acted as a scribe. I have seen and examined the patient, made my assessment and plan and have made all modifications necessary to the note.  Renee Galvez MD Pediatric Orthopaedics Surgery [facility]

## 2025-02-20 NOTE — HISTORY OF PRESENT ILLNESS
[FreeTextEntry1] : Flaca is a 15-year-old girl who has been experiencing chronic bilateral ankle swelling and mild discomfort along with right anterior knee pain.  She states that she twisted both her ankles status post fall down the stairs in 2019 and 2020.  She was treated for both of them at the urgent care and at [facility] emergency room x-rays ruled out any fractures.  She never did physical therapy.  She was also doing warm ups in gym September 2024 when she twisted her right knee resulting in discomfort.  She states she has lingering discomfort and on and off swelling with prolonged activities.  She presents today for pediatric orthopedic consultation.
[FreeTextEntry1] : Flaca is a 15-year-old girl who has been experiencing chronic bilateral ankle swelling and mild discomfort along with right anterior knee pain.  She states that she twisted both her ankles status post fall down the stairs in 2019 and 2020.  She was treated for both of them at the urgent care and at [facility] emergency room x-rays ruled out any fractures.  She never did physical therapy.  She was also doing warm ups in gym September 2024 when she twisted her right knee resulting in discomfort.  She states she has lingering discomfort and on and off swelling with prolonged activities.  She presents today for pediatric orthopedic consultation.
Poor

## 2025-02-20 NOTE — REASON FOR VISIT
[Initial Evaluation] : an initial evaluation [Patient] : patient [Mother] : mother [FreeTextEntry1] : Chronic ankle and right anterior knee pain

## 2025-02-20 NOTE — DATA REVIEWED
[de-identified] : Bilateral Ankle AP/lateral/oblique X-rays: There is no fracture or cortical irregularity noted. The growth plates are open with no widening or irregularities of the growth plate. The mortise joint appears normal with no widening over the medial or lateral aspect of the joint. There is no OCD lesion noted.  There is no callus formation indicating a hidden healing fracture. There are no suspicious cysts or masses noted. No signs of osteopenia.   Right knee AP/LAT/obl [place] x rays: No fracture. No OCD. The joint line appears normal.

## 2025-02-20 NOTE — PHYSICAL EXAM
[FreeTextEntry1] : Pleasant and cooperative with exam, appropriate for age. Ambulates without evidence of antalgia and limp, good coordination and balance. AAOX3  Skin: No rashes noted.  Eyes: Both conjunctiva, eyelids and pupils are present.  ENT:  Both ears, nose and lips are present. No nasal congestion.  Resp: No cough or wheezing noted.  Bilateral Ankle: Full active and passive range of motion of the ankle. There is no edema, ecchymosis or erythema noted over the ankle. 2+ pulses palpated. Capillary refill +1 in all toes. No lymphedema. Skin is warm and intact. Neurologically intact with intact Achilles DTR. Muscle strength 5/5. There is no pain elicited with palpation over the lateral, medial and posterior malleolus. There is no discomfort noted over the anterior aspect of the ankle. Negative anterior drawer sign. No pain elicited with palpation over the anterior, posterior tibiofibular ligament along with the deltoid ligament. Good flexibility of the Achilles tendon with the knee in flexion and extension. The joint is stable with stress maneuvers.   Right Knee: Full active and passive range of motion of the knee with good muscle strength 5 5. Neurologically intact. DTRs intact. There is no palpable or audible clicking in the knee with range of motion. There is no quadriceps atrophy noted. There is no edema, effusion, erythema or ecchymosis noted. There are no signs of Genu Varum or Valgum. There is no pain over the tibial tubercle, + mild pain elicited with palpation via patellar tendon. There is no discomfort with palpation over the medial/lateral joint space. There is no discomfort elicited with palpation over the medial/lateral aspect of the patella. There is no discomfort with palpation over the MCL/LCL ligaments. Negative patella apprehension sign. Negative patella grind test. Negative Evan's test. There is a good endpoint on Lachman's exam. Negative anterior/posterior drawer sign. The knee joint is stable with varus/valgus stress.  There is no active hip pain. 2+ pulses palpated, with capillary refill pulse one in all toes.

## 2025-02-20 NOTE — ASSESSMENT
[FreeTextEntry1] : Flaca is a 15-year-old girl who has chronic bilateral ankle and knee pain with swelling and occasional bouts of right knee pain status post injuries in the past. Today's assessment was performed with the assistance of the patient's parent as an independent historian as the patient's history is unreliable.  The recommendation at this time would be to complete a course of P.T. and do activities as tolerated. She will follow up in 6-8 weeks for a repeat examination and if there is no improvement an MRI may be indicated.   We had a thorough talk in regard to the diagnosis, prognosis and treatment modalities.  All questions and concerns were addressed today. There was a verbal understanding from the parents and patient.   ROSE MARIE Sharp have acted as a scribe and documented the above information for Dr. Galvez.   This note was generated using Dragon medical dictation software. A reasonable effort has been made for proofreading its contents; however, typos may still remain. If there are any questions or points of clarification needed, please do not hesitate to contact my office.   The above documentation completed by the scribe is an accurate record of both my words and actions.   Dr. Galvez

## 2025-03-04 NOTE — ASSESSMENT
[FreeTextEntry1] : 15 yo teen girl with history of headaches p/w worsening headaches with photophobia/nausea and vomiting in the mornings and awakening her at night x 1 month.

## 2025-03-04 NOTE — CONSULT LETTER
[Dear  ___] : Dear  [unfilled], [Consult Letter:] : I had the pleasure of evaluating your patient, [unfilled]. [Please see my note below.] : Please see my note below. [Consult Closing:] : Thank you very much for allowing me to participate in the care of this patient.  If you have any questions, please do not hesitate to contact me. [Sincerely,] : Sincerely, [FreeTextEntry3] : Michelle Guaman MD Child Neurologist [address], [address] [place] [address] Phone: [phone]

## 2025-03-04 NOTE — PHYSICAL EXAM
[Well-appearing] : well-appearing [Normocephalic] : normocephalic [No dysmorphic facial features] : no dysmorphic facial features [No ocular abnormalities] : no ocular abnormalities [Neck supple] : neck supple [No deformities] : no deformities [Alert] : alert [Well related, good eye contact] : well related, good eye contact [Conversant] : conversant [Normal speech and language] : normal speech and language [Follows instructions well] : follows instructions well [VFF] : VFF [Pupils reactive to light and accommodation] : pupils reactive to light and accommodation [Full extraocular movements] : full extraocular movements [Saccadic and smooth pursuits intact] : saccadic and smooth pursuits intact [No nystagmus] : no nystagmus [No papilledema] : no papilledema [Normal facial sensation to light touch] : normal facial sensation to light touch [No facial asymmetry or weakness] : no facial asymmetry or weakness [Gross hearing intact] : gross hearing intact [Equal palate elevation] : equal palate elevation [Good shoulder shrug] : good shoulder shrug [Normal tongue movement] : normal tongue movement [Midline tongue, no fasciculations] : midline tongue, no fasciculations [Normal axial and appendicular muscle tone] : normal axial and appendicular muscle tone [Gets up on table without difficulty] : gets up on table without difficulty [No pronator drift] : no pronator drift [Normal finger tapping and fine finger movements] : normal finger tapping and fine finger movements [No abnormal involuntary movements] : no abnormal involuntary movements [5/5 strength in proximal and distal muscles of arms and legs] : 5/5 strength in proximal and distal muscles of arms and legs [Able to walk on heels] : able to walk on heels [Able to walk on toes] : able to walk on toes [2+ biceps] : 2+ biceps [Triceps] : triceps [Knee jerks] : knee jerks [No ankle clonus] : no ankle clonus [No dysmetria on FTNT] : no dysmetria on FTNT [Good walking balance] : good walking balance [Normal gait] : normal gait [Able to tandem well] : able to tandem well [Negative Romberg] : negative Romberg

## 2025-03-04 NOTE — HISTORY OF PRESENT ILLNESS
[Throbbing] : throbbing [___ Times Per Week] : [unfilled] times each week [0] : a current pain level of 0/10 [7] : an average pain level of 7/10 [Nausea] : nausea [Photophobia] : photophobia [Dizziness] : dizziness [Vomiting] : Vomiting [Aura] : Aura: Yes [FreeTextEntry1] : UZIEL DISLA is a 15 year old girl who presents for initial evaluation for headaches.  She was referred by her pediatrician.  Over the last month has had worsening headaches.  Occur in morning on awakening or wake her up at night.  Associated with nausea and emesis.  There was a period she was vomiting daily in the morning x 2 weeks.  Current frequency is 2-3 times/week.  She attributes these episodes to waking up early in the morning for school (do not occur on weekends).  HA are relieved with ibuprofen or aleve.  Lying down does not help.  She has had intermittent headaches prior to this for years.  She generally wakes up nauseous and has not been able to eat breakfast in the morning due to nausea since she was a young child.  Her doctor prescribed her omeprazole previously, which she is not taking currently.  5-6 hours/night, difficulty falling asleep at night; takes melatonin (unknown amount) as needed No breakfast 12 pm or 3 pm is first meal Water 18 ozx3 daily Caffeine occasional  FHx: Mother with history of headaches in early adulthood [Head Trauma] : no head trauma [Infections] : no infections [Stressors] : no stressors [Previous Imaging] : none [Blurry Vision] : no blurry vision [Double Vision] : no double vision [Paraesthesias] : no paraesthesias  [Tinnitus] : Tinnitus [Confusion] : no confusion [Focal Weakness] : no focal weakness [Phonophobia] : no phonophobia [Scalp Tenderness] : no scalp tenderness [Conjunctival Injection] : no conjunctival injection [Scotoma] : no scotoma [Difficulty Speaking] : no difficulty speaking [Neck Pain] : no neck pain [Tearing] : no tearing [Weakness] : no weakness [de-identified] : years, worsening in last month [de-identified] : whole head or occipital [de-identified] : dizziness before

## 2025-03-04 NOTE — PLAN
[FreeTextEntry1] : Will order MRI brain to r/o structural lesion If normal, then could be migraine headaches Counseled on headache and sleep hygiene Start magnesium 400 mg qhs or migravent/migrelief daily May continue melatonin 3 mg qhs Ibuprofen 400 mg prn headaches, no more than 2-3 times/week F/u 3 months

## 2025-04-01 NOTE — PHYSICAL EXAM
[General Appearance - Alert] : alert [General Appearance - In No Acute Distress] : in no acute distress [Examination Of The Oral Cavity] : mucous membranes were moist and pink [No Cough] : no cough [Auscultation Breath Sounds / Voice Sounds] : breath sounds clear to auscultation bilaterally [Respiration, Rhythm And Depth] : normal respiratory rhythm and effort [Apical Impulse] : quiet precordium with normal apical impulse [Heart Rate And Rhythm] : normal heart rate and rhythm [Heart Sounds] : normal S1 and S2 [No Murmur] : no murmurs  [Heart Sounds Gallop] : no gallops [Heart Sounds Pericardial Friction Rub] : no pericardial rub [Edema] : no edema [Arterial Pulses] : normal upper and lower extremity pulses with no pulse delay [Heart Sounds Click] : no clicks [Capillary Refill Test] : normal capillary refill [Bowel Sounds] : normal bowel sounds [Abdomen Soft] : soft [Nondistended] : nondistended [Abdomen Tenderness] : non-tender [] : no hepato-splenomegaly [Nail Clubbing] : no clubbing  or cyanosis of the fingers [de-identified] : warm and well perfused

## 2025-04-01 NOTE — REASON FOR VISIT
[Initial Consultation] : an initial consultation for [Murmurs] : a murmur [Patient] : patient [Mother] : mother [FreeTextEntry3] : Years ago at old PCP.

## 2025-04-01 NOTE — CONSULT LETTER
[Today's Date] : [unfilled] [Name] : Name: [unfilled] [] : : ~~ [Today's Date:] : [unfilled] [Dear  ___:] : Dear Dr. [unfilled]: [Consult - Single Provider] : Thank you very much for allowing me to participate in the care of this patient. If you have any questions, please do not hesitate to contact me. [Sincerely,] : Sincerely, [DrElizabeth  ___] : Dr. NIÑO [DrElizabeth ___] : Dr. NIÑO [FreeTextEntry4] : Angie Bower [FreeTextEntry5] : [address] [facility] [FreeTextEntry6] : BENTLEY Jaime [FreeTextEntry7] : [phone] [de-identified] : Jennifer Smerling, MD Pediatric Cardiology [facility]

## 2025-04-01 NOTE — PHYSICAL EXAM
[General Appearance - Alert] : alert [General Appearance - In No Acute Distress] : in no acute distress [Examination Of The Oral Cavity] : mucous membranes were moist and pink [No Cough] : no cough [Auscultation Breath Sounds / Voice Sounds] : breath sounds clear to auscultation bilaterally [Respiration, Rhythm And Depth] : normal respiratory rhythm and effort [Apical Impulse] : quiet precordium with normal apical impulse [Heart Rate And Rhythm] : normal heart rate and rhythm [Heart Sounds] : normal S1 and S2 [No Murmur] : no murmurs  [Heart Sounds Gallop] : no gallops [Heart Sounds Pericardial Friction Rub] : no pericardial rub [Edema] : no edema [Arterial Pulses] : normal upper and lower extremity pulses with no pulse delay [Heart Sounds Click] : no clicks [Capillary Refill Test] : normal capillary refill [Bowel Sounds] : normal bowel sounds [Abdomen Soft] : soft [Nondistended] : nondistended [Abdomen Tenderness] : non-tender [] : no hepato-splenomegaly [Nail Clubbing] : no clubbing  or cyanosis of the fingers [de-identified] : warm and well perfused

## 2025-04-01 NOTE — PHYSICAL EXAM
[General Appearance - Alert] : alert [General Appearance - In No Acute Distress] : in no acute distress [Examination Of The Oral Cavity] : mucous membranes were moist and pink [No Cough] : no cough [Auscultation Breath Sounds / Voice Sounds] : breath sounds clear to auscultation bilaterally [Respiration, Rhythm And Depth] : normal respiratory rhythm and effort [Apical Impulse] : quiet precordium with normal apical impulse [Heart Rate And Rhythm] : normal heart rate and rhythm [Heart Sounds] : normal S1 and S2 [No Murmur] : no murmurs  [Heart Sounds Gallop] : no gallops [Heart Sounds Pericardial Friction Rub] : no pericardial rub [Edema] : no edema [Arterial Pulses] : normal upper and lower extremity pulses with no pulse delay [Heart Sounds Click] : no clicks [Capillary Refill Test] : normal capillary refill [Bowel Sounds] : normal bowel sounds [Abdomen Soft] : soft [Nondistended] : nondistended [Abdomen Tenderness] : non-tender [] : no hepato-splenomegaly [Nail Clubbing] : no clubbing  or cyanosis of the fingers [de-identified] : warm and well perfused

## 2025-04-01 NOTE — PHYSICAL EXAM
[General Appearance - Alert] : alert [General Appearance - In No Acute Distress] : in no acute distress [Examination Of The Oral Cavity] : mucous membranes were moist and pink [No Cough] : no cough [Auscultation Breath Sounds / Voice Sounds] : breath sounds clear to auscultation bilaterally [Respiration, Rhythm And Depth] : normal respiratory rhythm and effort [Apical Impulse] : quiet precordium with normal apical impulse [Heart Rate And Rhythm] : normal heart rate and rhythm [Heart Sounds] : normal S1 and S2 [No Murmur] : no murmurs  [Heart Sounds Gallop] : no gallops [Heart Sounds Pericardial Friction Rub] : no pericardial rub [Edema] : no edema [Arterial Pulses] : normal upper and lower extremity pulses with no pulse delay [Heart Sounds Click] : no clicks [Capillary Refill Test] : normal capillary refill [Bowel Sounds] : normal bowel sounds [Abdomen Soft] : soft [Nondistended] : nondistended [Abdomen Tenderness] : non-tender [] : no hepato-splenomegaly [Nail Clubbing] : no clubbing  or cyanosis of the fingers [de-identified] : warm and well perfused

## 2025-04-01 NOTE — CONSULT LETTER
[Today's Date] : [unfilled] [Name] : Name: [unfilled] [] : : ~~ [Today's Date:] : [unfilled] [Dear  ___:] : Dear Dr. [unfilled]: [Consult - Single Provider] : Thank you very much for allowing me to participate in the care of this patient. If you have any questions, please do not hesitate to contact me. [Sincerely,] : Sincerely, [DrElizabeth  ___] : Dr. NIÑO [DrElizabeth ___] : Dr. NIÑO [FreeTextEntry4] : Angie Bower [FreeTextEntry5] : [address] [facility] [FreeTextEntry6] : BENTLEY Jaime [FreeTextEntry7] : [phone] [de-identified] : Jennifer Smerling, MD Pediatric Cardiology [facility]

## 2025-04-01 NOTE — CONSULT LETTER
[Today's Date] : [unfilled] [Name] : Name: [unfilled] [] : : ~~ [Today's Date:] : [unfilled] [Dear  ___:] : Dear Dr. [unfilled]: [Consult - Single Provider] : Thank you very much for allowing me to participate in the care of this patient. If you have any questions, please do not hesitate to contact me. [Sincerely,] : Sincerely, [DrElizabeth  ___] : Dr. NIÑO [DrElizabeth ___] : Dr. NIÑO [FreeTextEntry4] : Angie Bower [FreeTextEntry5] : [address] [facility] [FreeTextEntry6] : BENTLEY Jaime [FreeTextEntry7] : [phone] [de-identified] : Jennifer Smerling, MD Pediatric Cardiology [facility]

## 2025-04-01 NOTE — CARDIOLOGY SUMMARY
[de-identified] : 3/26/25 [FreeTextEntry1] : Sinus bradycardia, normal axis and intervals for age. No preexcitation. Heart rate 59 bpm, MT interval 130 msec and QTc 392 msec.    [de-identified] : 3/26/25 [FreeTextEntry2] : Summary: 1. Normal valvar function. 2. Normal left ventricular size, morphology and systolic function. 3. Normal right ventricular morphology with qualitatively normal size and systolic function. 4. No pericardial effusion. 5. Right coronary artery not well seen by color doppler.

## 2025-04-01 NOTE — REVIEW OF SYSTEMS
[Chest Pain] : chest pain  or discomfort [Nl] : Endocrine [Feeling Poorly] : not feeling poorly (malaise) [Fever] : no fever [Nasal Stuffiness] : no nasal congestion [Cyanosis] : no cyanosis [Edema] : no edema [Diaphoresis] : not diaphoretic [Exercise Intolerance] : no persistence of exercise intolerance [Palpitations] : no palpitations [Orthopnea] : no orthopnea [Fast HR] : no tachycardia [Tachypnea] : not tachypneic [Wheezing] : no wheezing [Cough] : no cough [Shortness Of Breath] : not expressed as feeling short of breath [Vomiting] : no vomiting [Abdominal Pain] : no abdominal pain [Decrease In Appetite] : appetite not decreased [Fainting (Syncope)] : no fainting [Seizure] : no seizures [Easy Bruising] : no tendency for easy bruising [Easy Bleeding] : no ~M tendency for easy bleeding [Failure To Thrive] : no failure to thrive [FreeTextEntry1] : Chest pain on exertion- going up stairs in school or participating in gym.

## 2025-04-01 NOTE — CARDIOLOGY SUMMARY
[de-identified] : 3/26/25 [FreeTextEntry1] : Sinus bradycardia, normal axis and intervals for age. No preexcitation. Heart rate 59 bpm, [place] interval 130 msec and QTc 392 msec.    [de-identified] : 3/26/25 [FreeTextEntry2] : Summary: 1. Normal valvar function. 2. Normal left ventricular size, morphology and systolic function. 3. Normal right ventricular morphology with qualitatively normal size and systolic function. 4. No pericardial effusion. 5. Right coronary artery not well seen by color doppler.

## 2025-04-01 NOTE — CARDIOLOGY SUMMARY
[de-identified] : 3/26/25 [FreeTextEntry1] : Sinus bradycardia, normal axis and intervals for age. No preexcitation. Heart rate 59 bpm, [place] interval 130 msec and QTc 392 msec.    [de-identified] : 3/26/25 [FreeTextEntry2] : Summary: 1. Normal valvar function. 2. Normal left ventricular size, morphology and systolic function. 3. Normal right ventricular morphology with qualitatively normal size and systolic function. 4. No pericardial effusion. 5. Right coronary artery not well seen by color doppler.

## 2025-04-01 NOTE — CONSULT LETTER
[Today's Date] : [unfilled] [Name] : Name: [unfilled] [] : : ~~ [Today's Date:] : [unfilled] [Dear  ___:] : Dear Dr. [unfilled]: [Consult - Single Provider] : Thank you very much for allowing me to participate in the care of this patient. If you have any questions, please do not hesitate to contact me. [Sincerely,] : Sincerely, [DrElizabeth  ___] : Dr. NIÑO [DrElizabeth ___] : Dr. NIÑO [FreeTextEntry4] : Angie Bower [FreeTextEntry5] : [address] [facility] [FreeTextEntry6] : BENTLEY Jaime [FreeTextEntry7] : [phone] [de-identified] : Jennifer Smerling, MD Pediatric Cardiology [facility]

## 2025-04-01 NOTE — CARDIOLOGY SUMMARY
[de-identified] : 3/26/25 [FreeTextEntry1] : Sinus bradycardia, normal axis and intervals for age. No preexcitation. Heart rate 59 bpm, [place] interval 130 msec and QTc 392 msec.    [de-identified] : 3/26/25 [FreeTextEntry2] : Summary: 1. Normal valvar function. 2. Normal left ventricular size, morphology and systolic function. 3. Normal right ventricular morphology with qualitatively normal size and systolic function. 4. No pericardial effusion. 5. Right coronary artery not well seen by color doppler.

## 2025-04-25 NOTE — DATA REVIEWED
[de-identified] : No new imaging was obtained during today's visit. Prior obtained imaging was once again reviewed and is as noted below.  Bilateral Ankle AP/lateral/oblique X-rays: There is no fracture or cortical irregularity noted. The growth plates are open with no widening or irregularities of the growth plate. The mortise joint appears normal with no widening over the medial or lateral aspect of the joint. There is no OCD lesion noted.  There is no callus formation indicating a hidden healing fracture. There are no suspicious cysts or masses noted. No signs of osteopenia.   Right knee AP/LAT/obl [place] x rays: No fracture. No OCD. The joint line appears normal.

## 2025-04-25 NOTE — PHYSICAL EXAM
[FreeTextEntry1] : Pleasant and cooperative with exam, appropriate for age. Ambulates without evidence of antalgia and limp, good coordination and balance. AAOX3  Skin: No rashes noted.  Eyes: Both conjunctiva, eyelids and pupils are present.  ENT:  Both ears, nose and lips are present. No nasal congestion.  Resp: No cough or wheezing noted.  Bilateral Ankle: Full active and passive range of motion of the ankle. There is no edema, ecchymosis or erythema noted over the ankle. 2+ pulses palpated. Capillary refill +1 in all toes. No lymphedema. Skin is warm and intact. Neurologically intact with intact Achilles DTR. Muscle strength 5/5. There is no pain elicited with palpation over the lateral, medial and posterior malleolus. There is no discomfort noted over the anterior aspect of the ankle. Negative anterior drawer sign. No pain elicited with palpation over the anterior, posterior tibiofibular ligament along with the deltoid ligament. Good flexibility of the Achilles tendon with the knee in flexion and extension. The joint is stable with stress maneuvers.   Bilateral Knee:  Full active and passive range of motion of the knee with good muscle strength 5 5.  Neurologically intact. DTRs intact.  There is no palpable or audible clicking in the knee with range of motion.  There is no quadriceps atrophy noted.  There is no edema, effusion, erythema or ecchymosis noted.  There are no signs of Genu Varum or Valgum.  There is no pain over the tibial tubercle no further pain elicited with palpation via patellar tendon on the right. There is no discomfort with palpation over the medial/lateral joint space. There is no discomfort elicited with palpation over the medial/lateral aspect of the patella. There is no discomfort with palpation over the MCL ligaments. Mild discomfort over LCL on the left Negative patella apprehension sign. Negative patella grind test.  Negative Evan's test.  There is a good endpoint on Lachman's exam.  Negative anterior/posterior drawer sign.  The knee joint is stable with varus/valgus stress.   There is no active hip pain. 2+ pulses palpated, with capillary refill pulse one in all toes.

## 2025-04-25 NOTE — END OF VISIT
[FreeTextEntry3] : A physician assistant/resident assisted with documenting the visit and acted as a scribe. I have seen and examined the patient, made my assessment and plan and have made all modifications necessary to the note.  Renee Galvez MD Pediatric Orthopaedics Surgery [facility]

## 2025-04-25 NOTE — HISTORY OF PRESENT ILLNESS
[FreeTextEntry1] : Flaca is a 15-year-old female who has been experiencing chronic bilateral ankle swelling and mild discomfort along with bilateral anterior knee pain.  On initial evaluation she reported that she had twisted both her ankles status post fall down the stairs in 2019 and 2020.  She was treated for both of them at the urgent care and at [facility] emergency room radiographs ruled out any fractures.  She never did physical therapy.  She was also doing warm ups in gym September 2024 when she twisted her right knee resulting in discomfort.  On initial evaluation she was ordered a course of PT. Please see prior clinic notes for additional information.   Today, she reports improvement in her ankle swelling. She also has improvement of her right knee pain. She does note new onset left knee pain with prolonged ambulation. She completed her PT sessions yesterday. She denies any need for pain medication. She presents today for continued management of the above.

## 2025-04-25 NOTE — REASON FOR VISIT
[Initial Evaluation] : an initial evaluation [Patient] : patient [Mother] : mother [FreeTextEntry1] : Chronic ankle and knee pain

## 2025-04-25 NOTE — REVIEW OF SYSTEMS
[Joint Pains] : arthralgias [Change in Activity] : no change in activity [Sore Throat] : no sore throat [Wheezing] : no wheezing [Limping] : no limping [Joint Swelling] : no joint swelling

## 2025-04-25 NOTE — END OF VISIT
[FreeTextEntry3] : A physician assistant/resident assisted with documenting the visit and acted as a scribe. I have seen and examined the patient, made my assessment and plan and have made all modifications necessary to the note.  Renee Galvez MD Pediatric Orthopaedics Surgery [facility]

## 2025-04-25 NOTE — DATA REVIEWED
[de-identified] : No new imaging was obtained during today's visit. Prior obtained imaging was once again reviewed and is as noted below.  Bilateral Ankle AP/lateral/oblique X-rays: There is no fracture or cortical irregularity noted. The growth plates are open with no widening or irregularities of the growth plate. The mortise joint appears normal with no widening over the medial or lateral aspect of the joint. There is no OCD lesion noted.  There is no callus formation indicating a hidden healing fracture. There are no suspicious cysts or masses noted. No signs of osteopenia.   Right knee AP/LAT/obl [place] x rays: No fracture. No OCD. The joint line appears normal.

## 2025-04-25 NOTE — ASSESSMENT
[FreeTextEntry1] : Flaca is a 15-year-old female who has chronic bilateral ankle and knee pain with swelling    We discussed the interval progress and physical exam at length during today's visit with patient and her parent/guardian who served as an independent historian due to child's age and unreliable nature of history. The etiology, pathoanatomy, treatment modalities, and expected natural history of the injury were discussed at length today. Clinically, she has some improvement in her discomfort of the ankles and right knee. She has improvement in her ankle swelling. She does note new onset left knee discomfort with prolonged ambulation. Recommendation at this time is to continue with PT, focusing on the knees. An updated prescription was provided today. She can continue with activity as tolerated.   We will plan to see her back in clinic in approximately 8 weeks for reevaluation.  All questions and concerns were addressed today. Parent and patient verbalize understanding and agree with plan of care.   I, Peri Zamora, have acted as a scribe and documented the above information for Dr. Galvez.   This note was created using Dragon Voice Recognition Software and may have been partially created using Com[employer] software which was then reviewed and edited to the best of my ability. Sporadic inaccurate translation may have occurred. If there are any questions about content of the note, please contact the office for clarification.

## 2025-04-25 NOTE — HISTORY OF PRESENT ILLNESS
[FreeTextEntry1] : Flaca is a 15-year-old female who has been experiencing chronic bilateral ankle swelling and mild discomfort along with bilateral anterior knee pain.  On initial evaluation she reported that she had twisted both her ankles status post fall down the stairs in 2019 and 2020.  She was treated for both of them at the urgent care and at [facility] emergency room radiographs ruled out any fractures.  She never did physical therapy.  She was also doing warm ups in gym September 2024 when she twisted her right knee resulting in discomfort.  On initial evaluation she was ordered a course of PT. Please see prior clinic notes for additional information.   Today, she reports improvement in her ankle swelling. She also has improvement of her right knee pain. She does note new onset left knee pain with prolonged ambulation. She completed her PT sessions yesterday. She denies any need for pain medication. She presents today for continued management of the above.

## 2025-04-25 NOTE — ASSESSMENT
[FreeTextEntry1] : Flaca is a 15-year-old female who has chronic bilateral ankle and knee pain with swelling    We discussed the interval progress and physical exam at length during today's visit with patient and her parent/guardian who served as an independent historian due to child's age and unreliable nature of history. The etiology, pathoanatomy, treatment modalities, and expected natural history of the injury were discussed at length today. Clinically, she has some improvement in her discomfort of the ankles and right knee. She has improvement in her ankle swelling. She does note new onset left knee discomfort with prolonged ambulation. Recommendation at this time is to continue with PT, focusing on the knees. An updated prescription was provided today. She can continue with activity as tolerated.   We will plan to see her back in clinic in approximately 8 weeks for reevaluation.  All questions and concerns were addressed today. Parent and patient verbalize understanding and agree with plan of care.   I, Peri Zamora, have acted as a scribe and documented the above information for Dr. Galvez.   This note was created using Dragon Voice Recognition Software and may have been partially created using Impactia software which was then reviewed and edited to the best of my ability. Sporadic inaccurate translation may have occurred. If there are any questions about content of the note, please contact the office for clarification.

## 2025-06-04 NOTE — SOCIAL HISTORY
[House] : [unfilled] lives in a house  [Dog] : dog [Smokers in Household] : there are no smokers in the home [de-identified] : wood

## 2025-06-04 NOTE — CONSULT LETTER
[Dear  ___] : Dear ~BIN, [Consult Letter:] : I had the pleasure of evaluating your patient, [unfilled]. [Please see my note below.] : Please see my note below. [Consult Closing:] : Thank you very much for allowing me to participate in the care of this patient.  If you have any questions, please do not hesitate to contact me. [Sincerely,] : Sincerely, [FreeTextEntry2] : Angie Bower NP [FreeTextEntry3] : Elli Salmon MD Attending Physician  Division of Allergy/Immunology  [facility]    of Medicine and Pediatrics [facility] [place] [address] Tel: [phone] Fax: [phone] Email: [email or web address]

## 2025-06-04 NOTE — PHYSICAL EXAM
[Alert] : alert [Well Nourished] : well nourished [Healthy Appearance] : healthy appearance [No Acute Distress] : no acute distress [Well Developed] : well developed [Normal Pupil & Iris Size/Symmetry] : normal pupil and iris size and symmetry [No Discharge] : no discharge [No Photophobia] : no photophobia [Sclera Not Icteric] : sclera not icteric [Normal TMs] : both tympanic membranes were normal [Normal Nasal Mucosa] : the nasal mucosa was normal [Normal Lips/Tongue] : the lips and tongue were normal [Normal Outer Ear/Nose] : the ears and nose were normal in appearance [Normal Tonsils] : normal tonsils [No Thrush] : no thrush [Pale mucosa] : pale mucosa [Boggy Nasal Turbinates] : boggy and/or pale nasal turbinates [Supple] : the neck was supple [Normal Rate and Effort] : normal respiratory rhythm and effort [No Crackles] : no crackles [No Retractions] : no retractions [Bilateral Audible Breath Sounds] : bilateral audible breath sounds [Normal Rate] : heart rate was normal  [Normal S1, S2] : normal S1 and S2 [No murmur] : no murmur [Regular Rhythm] : with a regular rhythm [Soft] : abdomen soft [Not Tender] : non-tender [Not Distended] : not distended [No HSM] : no hepato-splenomegaly [Normal Cervical Lymph Nodes] : cervical [Skin Intact] : skin intact  [No Rash] : no rash [No Skin Lesions] : no skin lesions [No clubbing] : no clubbing [No Edema] : no edema [No Cyanosis] : no cyanosis [Normal Mood] : mood was normal [Normal Affect] : affect was normal [Alert, Awake, Oriented as Age-Appropriate] : alert, awake, oriented as age appropriate [Posterior Pharyngeal Cobblestoning] : no posterior pharyngeal cobblestoning [de-identified] : transverse nasal crease

## 2025-06-04 NOTE — HISTORY OF PRESENT ILLNESS
[de-identified] : Flaca is a 15 year old girl with a history of possible allergy to amoxicillin who presents for initial allergy evaluation.  About 5 years ago she took a course of amoxicillin for an AOM and a few days into the course she developed a rash that was flat in some areas and hive-like in other areas. Stopped the abx, mom gave Benadryl and rash resolved.  Apart from this she took amox a few other times for an AOM - tolerated these courses with no issues.  Has tolerated other antibiotics but does not know the names.  No other medical allergies - tolerates motrin and tylenol.  Hx of allergic rhinitis to dust mites. Does not notice sx related to pollen.  One small area of eczema on her arm in 1 spot.  No recent antihistamine use.  Ingestion of cinnamon causes hives.   At one point in time she was having periods of hives - 2022. Ears got swollen out of nowhere. Hives on legs, arms, face. Once had 2 hives on the side of her throat - went to the ED and was incidentally found to be COVID positive. Prior to that pt had received the COVID vaccines and boosters - started to develop chronic urticaria around the same time. Had chronic hives for months - seen by derm. Missed a lot of school and took a lot of Benadryl. Hives haves have not returned.   Father has grave's disease. Mom has type II diabetes.

## 2025-06-06 NOTE — HISTORY OF PRESENT ILLNESS
[FreeTextEntry1] : UZIEL DISLA is a 15 year old girl who presents for follow up evaluation for Last seen 3/2025.  Since last visit, having headaches ~once/week, responsive to aleve.  Possible triggers include her menses.  She skips breakfast. She takes magnesium 400 mg prn.  She did not need rizatriptan. MRI showed nonspecific signal abnormalities  History reviewed (last note 3/4/25): Over the last month has had worsening headaches.  Occur in morning on awakening or wake her up at night.  Associated with nausea and emesis.  There was a period she was vomiting daily in the morning x 2 weeks.  Current frequency is 2-3 times/week.  She attributes these episodes to waking up early in the morning for school (do not occur on weekends).  HA are relieved with ibuprofen or aleve.  Lying down does not help.  She has had intermittent headaches prior to this for years.  She generally wakes up nauseous and has not been able to eat breakfast in the morning due to nausea since she was a young child.  Her doctor prescribed her omeprazole previously, which she is not taking currently.  5-6 hours/night, difficulty falling asleep at night; takes melatonin (unknown amount) as needed No breakfast 12 pm or 3 pm is first meal Water 18 ozx3 daily Caffeine occasional  FHx: Mother with history of headaches in early adulthood [Head Trauma] : no head trauma [Infections] : no infections [Stressors] : no stressors [Previous Imaging] : none [Throbbing] : throbbing [___ Times Per Week] : [unfilled] times each week [0] : a current pain level of 0/10 [7] : an average pain level of 7/10 [Blurry Vision] : no blurry vision [Double Vision] : no double vision [Paraesthesias] : no paraesthesias  [Tinnitus] : Tinnitus [Confusion] : no confusion [Focal Weakness] : no focal weakness [Phonophobia] : no phonophobia [Scalp Tenderness] : no scalp tenderness [Conjunctival Injection] : no conjunctival injection [Nausea] : nausea [Photophobia] : photophobia [Scotoma] : no scotoma [Difficulty Speaking] : no difficulty speaking [Neck Pain] : no neck pain [Tearing] : no tearing [Weakness] : no weakness [Dizziness] : dizziness [Vomiting] : Vomiting [Aura] : Aura: Yes [de-identified] : years, worsening in last month [de-identified] : whole head or occipital [de-identified] : dizziness before

## 2025-06-06 NOTE — CONSULT LETTER
[Dear  ___] : Dear  [unfilled], [Courtesy Letter:] : I had the pleasure of seeing your patient, [unfilled], in my office today. [Please see my note below.] : Please see my note below. [Sincerely,] : Sincerely, [FreeTextEntry3] : Michelle Guaman MD Child Neurologist [address], [address] [place] [address] Phone: [phone]

## 2025-06-06 NOTE — ASSESSMENT
[FreeTextEntry1] : 15 yo teen girl with history of migraine headaches, frequency somewhat improved since last visit

## 2025-06-06 NOTE — PLAN
[FreeTextEntry1] : Reviewed MRI findings, will obtain follow up scan in one year  Avoid skipping meals Discussed magnesium should be given daily as 400 mg Continue ibuprofen or naproxen prn headaches, no more than 2-3 times/week F/u 4-6 months

## 2025-07-21 NOTE — DATA REVIEWED
[de-identified] : AP and frog lateral pelvis XRs performed and reviewed in office today, 7/18/25 Bilateral hips are well located. No radiographic evidence of impingement. Center edge angle is >30 degrees bilaterally.   Bilateral Ankle AP/lateral/oblique X-rays 2/14/25: There is no fracture or cortical irregularity noted. The growth plates are open with no widening or irregularities of the growth plate. The mortise joint appears normal with no widening over the medial or lateral aspect of the joint. There is no OCD lesion noted.  There is no callus formation indicating a hidden healing fracture. There are no suspicious cysts or masses noted. No signs of osteopenia.   Right knee AP/LAT/obl [place] x rays 2/14/25: No fracture. No OCD. The joint line appears normal.

## 2025-07-21 NOTE — HISTORY OF PRESENT ILLNESS
[FreeTextEntry1] : Flaca is a 16-year-old female who has been experiencing chronic bilateral ankle swelling and mild discomfort along with bilateral anterior knee pain.    On initial evaluation she reported that she had twisted both her ankles status post fall down the stairs in 2019 and 2020.  She was treated for both of them at the urgent care and at [facility] emergency room radiographs ruled out any fractures.  She never did physical therapy.  She was also doing warm ups in gym September 2024 when she twisted her right knee resulting in discomfort.  On initial evaluation she was ordered a course of PT. Please see prior clinic notes for additional information.   4/18/25: she reports improvement in her ankle swelling. She also has improvement of her right knee pain. She does note new onset left knee pain with prolonged ambulation. She completed her PT sessions yesterday. She denies any need for pain medication. She had new onset L knee pain with prolonged ambulation. A new PT script was provided.   7/18/25: she returns today for f/u. She has been participating in physical therapy, however continues to complain of persistent knee pain. No knee locking, catching or giving way. No ankle or knee swelling reported. Her therapist suggested her lower extremity issues may be stemming from her hips. She presents today for clinical reassessment.

## 2025-07-21 NOTE — REASON FOR VISIT
[Follow Up] : a follow up visit [Patient] : patient [Mother] : mother [FreeTextEntry1] : Chronic ankle and knee pain

## 2025-07-21 NOTE — ASSESSMENT
[FreeTextEntry1] : Flaca is a 16-year-old female who has chronic bilateral ankle and knee pain.   We discussed the interval progress and physical exam at length during today's visit with patient and her parent/guardian who served as an independent historian due to child's age and unreliable nature of history. The etiology, pathoanatomy, treatment modalities, and expected natural history of the injury were discussed at length today.  Clinically she continues to have significant bilateral knee pain, despite a course of physical therapy. Her pain has been limiting her ability to participate in all activities. At this point I am recommending an MRI of bilateral knees for further evaluation. My office will obtain insurance authorization and reach out to family to schedule. Follow up recommended after MRI to review results. She also had hip x-rays today which were within normal limits. She can continue to participate in activities within the limits of pain. All questions and concerns were addressed today. Family verbalizes understanding and agree with plan of care.   I, Alejandra Cano PA-C, have acted as a scribe and documented the above information for .

## 2025-07-21 NOTE — HISTORY OF PRESENT ILLNESS
[FreeTextEntry1] : Flaca is a 16-year-old female who has been experiencing chronic bilateral ankle swelling and mild discomfort along with bilateral anterior knee pain.    On initial evaluation she reported that she had twisted both her ankles status post fall down the stairs in 2019 and 2020.  She was treated for both of them at the urgent care and at [facility] emergency room radiographs ruled out any fractures.  She never did physical therapy.  She was also doing warm ups in gym September 2024 when she twisted her right knee resulting in discomfort.  On initial evaluation she was ordered a course of PT. Please see prior clinic notes for additional information.   4/18/25: she reports improvement in her ankle swelling. She also has improvement of her right knee pain. She does note new onset left knee pain with prolonged ambulation. She completed her PT sessions yesterday. She denies any need for pain medication. She had new onset L knee pain with prolonged ambulation. A new PT script was provided.   7/18/25: she returns today for f/u. She has been participating in physical therapy, however continues to complain of persistent knee pain. No knee locking, catching or giving way. No ankle or knee swelling reported. Her therapist suggested her lower extremity issues may be stemming from her hips. She presents today for clinical reassessment.

## 2025-07-21 NOTE — END OF VISIT
[FreeTextEntry3] : I, Renee Galvez MD, personally saw and evaluated the patient and developed the plan as documented above. I concur or have edited the note as appropriate.

## 2025-07-21 NOTE — DATA REVIEWED
[de-identified] : AP and frog lateral pelvis XRs performed and reviewed in office today, 7/18/25 Bilateral hips are well located. No radiographic evidence of impingement. Center edge angle is >30 degrees bilaterally.   Bilateral Ankle AP/lateral/oblique X-rays 2/14/25: There is no fracture or cortical irregularity noted. The growth plates are open with no widening or irregularities of the growth plate. The mortise joint appears normal with no widening over the medial or lateral aspect of the joint. There is no OCD lesion noted.  There is no callus formation indicating a hidden healing fracture. There are no suspicious cysts or masses noted. No signs of osteopenia.   Right knee AP/LAT/obl [place] x rays 2/14/25: No fracture. No OCD. The joint line appears normal.